# Patient Record
(demographics unavailable — no encounter records)

---

## 2024-10-15 NOTE — BEGINNING OF VISIT
[0] : 2) Feeling down, depressed, or hopeless: Not at all (0) [PHQ-2 Negative] : PHQ-2 Negative [Pain Scale: ___] : On a scale of 1-10, today the patient's pain is a(n) [unfilled]. [Never] : Never [Date Discussed (MM/DD/YY): ___] : Discussed: [unfilled] [With Patient/Caregiver] : with Patient/Caregiver [[id number]] : 0

## 2024-10-15 NOTE — REVIEW OF SYSTEMS
[Constipation] : constipation [Joint Pain] : joint pain [Joint Stiffness] : joint stiffness [Negative] : Allergic/Immunologic [Night Sweats] : no night sweats [Fatigue] : no fatigue [Chest Pain] : no chest pain [Shortness Of Breath] : no shortness of breath [FreeTextEntry4] : chronic acid reflux [FreeTextEntry7] : Miralax prn for constipation, Herbert's for GERD [FreeTextEntry1] : milk allergy [FreeTextEntry9] : OA,

## 2024-10-15 NOTE — ASSESSMENT
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 No new symptoms labs drawn in office today, 10/8/24 - wbc, hgb and plts wnl.  # IgM Kappa monoclonal gammopathy ? Waldenstroms - diagnosed by Dr Otero M spike was 1.1 in 2011 with Igm ~800s was treated with rituxan for CD 20 + B cell lymphoma and now repeat paraproteinemia work up with no M spike, normal IgM,  KFLC 3.35 SPEP with YESENIA, UPEP with, Immunoglobulins, viscosity, b2 microglobulin labs drawn in office today, 10/8/24 - wbc, hgb and plts wnl.   RTC in 6 months with cbc with diff, cmp, SPEP with YESENIA, UPEP with, Immunoglobulins, viscosity, b2 microglobulin

## 2024-10-15 NOTE — BEGINNING OF VISIT
[0] : 2) Feeling down, depressed, or hopeless: Not at all (0) [PHQ-2 Negative] : PHQ-2 Negative [Pain Scale: ___] : On a scale of 1-10, today the patient's pain is a(n) [unfilled]. [Never] : Never [Date Discussed (MM/DD/YY): ___] : Discussed: [unfilled] [With Patient/Caregiver] : with Patient/Caregiver [[id number]] : 0

## 2024-10-15 NOTE — HISTORY OF PRESENT ILLNESS
[0 - No Distress] : Distress Level: 0 [de-identified] :  96 year old female consult with a chronic history of anemia and Waldenstrom's macroglobulinemia and mgus. Patient also has a history of anemia for which she received iron. Patient is post chemotherapy in remission (VERA) of Lymphoma and was on continued therapy for marrow disease and waldenstrom's. Patient also has had multiple instances of skin cancer and is unsure of histology and continues to receive treatment.   Limited records available- per patient she has only received rituximab "for a long time" unsure how long Last rituxan was "years ago"  Was getting every 3 month check ups since  She feels good overall. She lives with her daughter  But able to do all her ADLs. [de-identified] : Ms. Webb is here today for a follow up for anemia ,Waldenstrom's macroglobulinemia and mgus.  She denies rash, fever, infections, bleeding, bruising, nausea, vomiting, cough, SOB, chest pain, change in BM, headache or dizziness.  Patient does have sciatica but reports feelings good right now.

## 2024-10-15 NOTE — REVIEW OF SYSTEMS
[Constipation] : constipation [Joint Pain] : joint pain [Joint Stiffness] : joint stiffness [Negative] : Allergic/Immunologic [Night Sweats] : no night sweats [Fatigue] : no fatigue [Chest Pain] : no chest pain [Shortness Of Breath] : no shortness of breath [FreeTextEntry4] : chronic acid reflux [FreeTextEntry7] : Miralax prn for constipation, Herbert's for GERD [FreeTextEntry9] : OA,  [FreeTextEntry1] : milk allergy

## 2024-10-15 NOTE — HISTORY OF PRESENT ILLNESS
[0 - No Distress] : Distress Level: 0 [de-identified] :  96 year old female consult with a chronic history of anemia and Waldenstrom's macroglobulinemia and mgus. Patient also has a history of anemia for which she received iron. Patient is post chemotherapy in remission (VERA) of Lymphoma and was on continued therapy for marrow disease and waldenstrom's. Patient also has had multiple instances of skin cancer and is unsure of histology and continues to receive treatment.   Limited records available- per patient she has only received rituximab "for a long time" unsure how long Last rituxan was "years ago"  Was getting every 3 month check ups since  She feels good overall. She lives with her daughter  But able to do all her ADLs. [de-identified] : Ms. Webb is here today for a follow up for anemia ,Waldenstrom's macroglobulinemia and mgus.  She denies rash, fever, infections, bleeding, bruising, nausea, vomiting, cough, SOB, chest pain, change in BM, headache or dizziness.  Patient does have sciatica but reports feelings good right now.

## 2024-10-15 NOTE — HISTORY OF PRESENT ILLNESS
[0 - No Distress] : Distress Level: 0 [de-identified] :  96 year old female consult with a chronic history of anemia and Waldenstrom's macroglobulinemia and mgus. Patient also has a history of anemia for which she received iron. Patient is post chemotherapy in remission (VERA) of Lymphoma and was on continued therapy for marrow disease and waldenstrom's. Patient also has had multiple instances of skin cancer and is unsure of histology and continues to receive treatment.   Limited records available- per patient she has only received rituximab "for a long time" unsure how long Last rituxan was "years ago"  Was getting every 3 month check ups since  She feels good overall. She lives with her daughter  But able to do all her ADLs. [de-identified] : Ms. Webb is here today for a follow up for anemia ,Waldenstrom's macroglobulinemia and mgus.  She denies rash, fever, infections, bleeding, bruising, nausea, vomiting, cough, SOB, chest pain, change in BM, headache or dizziness.  Patient does have sciatica but reports feelings good right now.